# Patient Record
Sex: MALE | Race: WHITE | NOT HISPANIC OR LATINO | Employment: OTHER | URBAN - METROPOLITAN AREA
[De-identification: names, ages, dates, MRNs, and addresses within clinical notes are randomized per-mention and may not be internally consistent; named-entity substitution may affect disease eponyms.]

---

## 2016-12-28 LAB — CRC RECOMMENDATION EXT: NORMAL

## 2017-08-21 LAB
BUN SERPL-MCNC: 17 MG/DL (ref 7–18)
CALCIUM SERPL-MCNC: 8.5 MG/DL (ref 9–10.9)
CHLORIDE SERPL-SCNC: 106 MMOL/L (ref 98–116)
CHOLEST SERPL-MCNC: 151 MG/DL
CO2 SERPL-SCNC: 29 MMOL/L (ref 15–28)
CREAT SERPL-MCNC: 0.99 MG/DL (ref 0.6–1.3)
GLUCOSE SERPL-MCNC: 100 MG/DL (ref 74–106)
HDLC SERPL-MCNC: 47 MG/DL (ref 35–60)
LDLC SERPL CALC-MCNC: 92 MG/DL
POTASSIUM SERPL-SCNC: 3.7 MMOL/L (ref 3.5–5.1)
SODIUM SERPL-SCNC: 142 MEQ/L (ref 131–145)
TRIGL SERPL-MCNC: 59 MG/DL (ref 30–150)

## 2022-04-09 ENCOUNTER — HISTORICAL (OUTPATIENT)
Dept: ADMINISTRATIVE | Facility: HOSPITAL | Age: 77
End: 2022-04-09

## 2022-04-25 VITALS
SYSTOLIC BLOOD PRESSURE: 110 MMHG | WEIGHT: 178 LBS | DIASTOLIC BLOOD PRESSURE: 58 MMHG | BODY MASS INDEX: 24.92 KG/M2 | HEIGHT: 71 IN | OXYGEN SATURATION: 96 %

## 2022-09-06 DIAGNOSIS — K40.90 INGUINAL HERNIA WITHOUT OBSTRUCTION OR GANGRENE, RECURRENCE NOT SPECIFIED, UNSPECIFIED LATERALITY: ICD-10-CM

## 2022-09-06 DIAGNOSIS — I10 HYPERTENSION, UNSPECIFIED TYPE: Primary | ICD-10-CM

## 2022-09-06 DIAGNOSIS — N40.0 BENIGN PROSTATIC HYPERPLASIA, UNSPECIFIED WHETHER LOWER URINARY TRACT SYMPTOMS PRESENT: ICD-10-CM

## 2022-09-06 DIAGNOSIS — Z00.00 WELL ADULT EXAM: ICD-10-CM

## 2022-09-06 DIAGNOSIS — Z12.5 SCREENING PSA (PROSTATE SPECIFIC ANTIGEN): ICD-10-CM

## 2022-09-08 ENCOUNTER — DOCUMENTATION ONLY (OUTPATIENT)
Dept: INTERNAL MEDICINE | Facility: CLINIC | Age: 77
End: 2022-09-08
Payer: MEDICARE

## 2022-09-14 ENCOUNTER — OFFICE VISIT (OUTPATIENT)
Dept: INTERNAL MEDICINE | Facility: CLINIC | Age: 77
End: 2022-09-14
Payer: MEDICARE

## 2022-09-14 VITALS
WEIGHT: 180.38 LBS | HEIGHT: 71 IN | DIASTOLIC BLOOD PRESSURE: 66 MMHG | BODY MASS INDEX: 25.25 KG/M2 | RESPIRATION RATE: 18 BRPM | HEART RATE: 52 BPM | SYSTOLIC BLOOD PRESSURE: 122 MMHG | OXYGEN SATURATION: 99 %

## 2022-09-14 DIAGNOSIS — Z12.5 ENCOUNTER FOR SCREENING FOR MALIGNANT NEOPLASM OF PROSTATE: ICD-10-CM

## 2022-09-14 DIAGNOSIS — R09.89 BRUIT OF RIGHT CAROTID ARTERY: ICD-10-CM

## 2022-09-14 DIAGNOSIS — N40.0 BENIGN PROSTATIC HYPERPLASIA, UNSPECIFIED WHETHER LOWER URINARY TRACT SYMPTOMS PRESENT: ICD-10-CM

## 2022-09-14 DIAGNOSIS — K40.90 INGUINAL HERNIA WITHOUT OBSTRUCTION OR GANGRENE, RECURRENCE NOT SPECIFIED, UNSPECIFIED LATERALITY: ICD-10-CM

## 2022-09-14 DIAGNOSIS — G47.33 OBSTRUCTIVE SLEEP APNEA SYNDROME: ICD-10-CM

## 2022-09-14 DIAGNOSIS — Z00.00 WELLNESS EXAMINATION: Primary | ICD-10-CM

## 2022-09-14 DIAGNOSIS — I10 HYPERTENSION, UNSPECIFIED TYPE: ICD-10-CM

## 2022-09-14 PROBLEM — R01.1 HEART MURMUR: Status: ACTIVE | Noted: 2022-09-14

## 2022-09-14 PROCEDURE — 99212 OFFICE O/P EST SF 10 MIN: CPT | Mod: 25,,, | Performed by: INTERNAL MEDICINE

## 2022-09-14 PROCEDURE — 99212 PR OFFICE/OUTPT VISIT, EST, LEVL II, 10-19 MIN: ICD-10-PCS | Mod: 25,,, | Performed by: INTERNAL MEDICINE

## 2022-09-14 PROCEDURE — G0439 PR MEDICARE ANNUAL WELLNESS SUBSEQUENT VISIT: ICD-10-PCS | Mod: ,,, | Performed by: INTERNAL MEDICINE

## 2022-09-14 PROCEDURE — G0439 PPPS, SUBSEQ VISIT: HCPCS | Mod: ,,, | Performed by: INTERNAL MEDICINE

## 2022-09-14 RX ORDER — TERAZOSIN 5 MG/1
5 CAPSULE ORAL DAILY
COMMUNITY
Start: 2022-07-22 | End: 2022-09-14 | Stop reason: SDUPTHER

## 2022-09-14 RX ORDER — TERAZOSIN 5 MG/1
5 CAPSULE ORAL DAILY
Qty: 90 CAPSULE | Refills: 3 | Status: SHIPPED | OUTPATIENT
Start: 2022-09-14 | End: 2023-09-19 | Stop reason: SDUPTHER

## 2022-09-14 RX ORDER — HYDROCHLOROTHIAZIDE 25 MG/1
25 TABLET ORAL DAILY
Qty: 90 TABLET | Refills: 3 | Status: SHIPPED | OUTPATIENT
Start: 2022-09-14 | End: 2023-09-19 | Stop reason: SDUPTHER

## 2022-09-14 RX ORDER — ENALAPRIL MALEATE 5 MG/1
5 TABLET ORAL DAILY
Qty: 90 TABLET | Refills: 3 | Status: SHIPPED | OUTPATIENT
Start: 2022-09-14 | End: 2023-09-19 | Stop reason: SDUPTHER

## 2022-09-14 RX ORDER — FINASTERIDE 5 MG/1
5 TABLET, FILM COATED ORAL DAILY
COMMUNITY
Start: 2022-08-19 | End: 2022-09-14 | Stop reason: SDUPTHER

## 2022-09-14 RX ORDER — POTASSIUM CHLORIDE 750 MG/1
10 TABLET, EXTENDED RELEASE ORAL DAILY
Qty: 90 TABLET | Refills: 3 | Status: SHIPPED | OUTPATIENT
Start: 2022-09-14 | End: 2023-09-19 | Stop reason: SDUPTHER

## 2022-09-14 RX ORDER — FINASTERIDE 5 MG/1
5 TABLET, FILM COATED ORAL DAILY
Qty: 90 TABLET | Refills: 3 | Status: SHIPPED | OUTPATIENT
Start: 2022-09-14 | End: 2023-09-11

## 2022-09-14 RX ORDER — POTASSIUM CHLORIDE 750 MG/1
10 TABLET, EXTENDED RELEASE ORAL DAILY
COMMUNITY
Start: 2022-08-08 | End: 2022-09-14 | Stop reason: SDUPTHER

## 2022-09-14 NOTE — PROGRESS NOTES
"Subjective:       Patient ID: German Godwin is a 76 y.o. male.    Chief Complaint: Medicare AWV      The patient is a 76-year-old man in for wellness check.  He feels well overall.  No new complaints.    There is a strong family history of prostate cancer and also follows up with his urologist, Dr. Briceno on a yearly basis.  I think he sees him in for 5 months.    Review of Systems     Current Outpatient Medications on File Prior to Visit   Medication Sig Dispense Refill    [DISCONTINUED] enalapril (VASOTEC) 5 MG tablet TAKE ONE TABLET BY MOUTH ONCE DAILY 90 tablet 0    [DISCONTINUED] finasteride (PROSCAR) 5 mg tablet Take 5 mg by mouth once daily.      [DISCONTINUED] hydroCHLOROthiazide (HYDRODIURIL) 25 MG tablet TAKE ONE TABLET BY MOUTH ONCE DAILY 90 tablet 0    [DISCONTINUED] potassium chloride SA (K-DUR,KLOR-CON M) 10 MEQ tablet Take 10 mEq by mouth once daily.      [DISCONTINUED] terazosin (HYTRIN) 5 MG capsule Take 5 mg by mouth once daily.       No current facility-administered medications on file prior to visit.     Objective:      /66 (BP Location: Right arm, Patient Position: Sitting, BP Method: Large (Manual))   Pulse (!) 52   Resp 18   Ht 5' 11" (1.803 m)   Wt 81.8 kg (180 lb 6.4 oz)   SpO2 99%   BMI 25.16 kg/m²     Physical Exam  Vitals reviewed.   Constitutional:       Appearance: Normal appearance.   HENT:      Head: Normocephalic and atraumatic.      Right Ear: Tympanic membrane normal.      Left Ear: Tympanic membrane normal.      Mouth/Throat:      Pharynx: Oropharynx is clear.   Eyes:      Pupils: Pupils are equal, round, and reactive to light.   Neck:      Vascular: No carotid bruit.      Comments: There is a right-sided carotid bruit.  This was not present on prior exam.  Cardiovascular:      Rate and Rhythm: Normal rate and regular rhythm.      Pulses: Normal pulses.      Heart sounds: Normal heart sounds.   Pulmonary:      Effort: Pulmonary effort is normal.      Breath sounds: " Normal breath sounds.   Abdominal:      General: Abdomen is flat.      Palpations: Abdomen is soft. There is no mass.      Tenderness: There is no abdominal tenderness. There is no guarding.   Genitourinary:     Comments: There is a large right inguinal hernia.  Reducible.  Nontender.  Rectal exam reveals a normal firm feeling prostate.  No nodules.  Heme-negative stool.  Musculoskeletal:         General: No swelling.      Cervical back: Neck supple.   Lymphadenopathy:      Cervical: No cervical adenopathy.   Skin:     General: Skin is warm and dry.   Neurological:      General: No focal deficit present.      Mental Status: He is alert and oriented to person, place, and time.     Lab work reviewed and acceptable  Assessment:       1. Wellness examination    2. Hypertension, unspecified type    3. Benign prostatic hyperplasia, unspecified whether lower urinary tract symptoms present    4. Inguinal hernia without obstruction or gangrene, recurrence not specified, unspecified laterality    5. Obstructive sleep apnea syndrome    6. Bruit of right carotid artery    7. Encounter for screening for malignant neoplasm of prostate        1. Wellness.  Overall stable     2. Hypertension.  Control    3. BPH.  Stable     4. Carotid bruit.  New finding.  Asymptomatic.      5. Obstructive sleep apnea.  Stable     6. Borderline hyperglycemia.  Stable  Plan:       Check carotid ultrasound.    Continue same meds and TLC and follow-up yearly with CBC CMP lipid TSH PSA prior.

## 2022-09-15 ENCOUNTER — HISTORICAL (OUTPATIENT)
Dept: ADMINISTRATIVE | Facility: HOSPITAL | Age: 77
End: 2022-09-15
Payer: MEDICARE

## 2022-09-27 NOTE — PROGRESS NOTES
Patient:   German Godwin             MRN: [id number]            FIN: [id number]               Age:   71 years     Sex:  Male     :  1945   Associated Diagnoses:   None   Author:   Garcia Vail MD      Chief Complaint   2017 13:00 CDT      Annual        History of Present Illness   The patient is a 71-year-old man in for yearly follow-up.  He feels fine.  Since I saw him last he had a screening colonoscopy which was negative.  He did not need a repeat for 10 years or perhaps not even been as he will be 81 by that time.  He continues to see his urologist on a regular basis.  He will see him until late .  He is requesting that we check the prostate today.    There is a strong family history of prostate cancer.                 Health Status   Allergies:    Allergic Reactions (Selected)  No Known Medication Allergies,    Allergies (1) Active Reaction  No Known Medication Allergies None Documented     Current medications:  (Selected)   Prescriptions  Prescribed  Klor-Con M10 oral tablet, extended release: See Instructions, TAKE ONE TABLET BY MOUTH ONCE DAILY, # 90 tab(s), 3 Refill(s), eRx: [id number]  enalapril 5 mg oral tablet: See Instructions, TAKE ONE TABLET BY MOUTH ONCE DAILY, # 90 tab(s), 3 Refill(s), eRx: [facility]  hydrochlorothiazide 25 mg oral tablet: See Instructions, TAKE ONE TABLET BY MOUTH ONCE DAILY , # 90 tab(s), 2 Refill(s), eRx: [facility]  terazosin 5 mg oral capsule: See Instructions, TAKE ONE CAPSULE BY MOUTH ONCE DAILY, # 90 cap(s), 3 Refill(s), eRx: [facility]  Documented Medications  Documented  DOXYCYCLINE IR-DR 40 M mg = 1 cap(s), Oral, Daily  MENS FORMULA: MENS FORMULA, TWO CAPS ONCE DAILY, 0 Refill(s)  MetroGel: 1 erin, TOP, Daily, daily, 0 Refill(s), Type: Maintenance  Vitamin C: 500 mg, Oral, Daily  aspirin 81 mg oral tablet: 1 tab(s) ( 81 mg ), PO, Daily, # 30 tab(s), 0  Refill(s), Type: Maintenance  finasteride 5 mg oral tablet: 1 tab(s) ( 5 mg ), PO, Daily, # 30 tab(s), 0 Refill(s), Type: Maintenance,    Home Medications (10) Active  aspirin 81 mg oral tablet   DOXYCYCLINE IR-DR 40 MG 40 mg = 1 cap(s), Oral, Daily  enalapril 5 mg oral tablet See Instructions  finasteride 5 mg oral tablet   hydrochlorothiazide 25 mg oral tablet See Instructions  Klor-Con M10 oral tablet, extended release See Instructions  MENS FORMULA   MetroGel 1 erin, TOP, Daily  terazosin 5 mg oral capsule See Instructions  Vitamin C 500 mg, Oral, Daily     Problem list:    All Problems  Benign essential hypertension / SNOMED CT 1191470 / Confirmed  Heart murmur(  Confirmed  ) / SNOMED CT 166278711 / Confirmed  Obstructive sleep apnea syndrome / SNOMED CT 159608684 / Confirmed  Well adult / SNOMED CT 606861636 / Confirmed  Resolved: Hypertension, essential(  Confirmed  ) / SNOMED CT 32929219  Resolved: Obstructive sleep apnea / SNOMED CT 1624583925  Resolved: Well adult health check(  Confirmed  ) / SNOMED CT 835878087      Histories   Past Medical History:    Resolved  Hypertension, essential(  Confirmed  ) (20400734):  Resolved on 8/12/2015 at 69 years.  Well adult health check(  Confirmed  ) (550371983):  Resolved on 8/12/2015 at 69 years.  Obstructive sleep apnea (6937984951):  Resolved on 8/17/2016 at 70 years.   Family History:    Cancer  Father     Procedure history:    Colonoscopy (521506049) on 12/28/2016 at 71 Years.  eye surgery in 1950 at 5 Years.  tonsillectomy.  throat surgery.   Social History        Social & Psychosocial Habits    Alcohol  08/12/2015  Use: Current    Frequency: Daily    Employment/School  08/12/2015  Status: Retired    Exercise  08/12/2015  Times per week: 5-6 times/week    Home/Environment  08/12/2015  Lives with: Spouse    Nutrition/Health  08/12/2015  Type of diet: Regular    Substance Abuse  08/12/2015  Use: Never    Tobacco  08/22/2017  Use: Former smoker     Comment: IN COLLEGE 45 YEARS AGO - 08/12/2015 09:01 - Beckie WATSONSonya  .        Physical Examination   Vital Signs   8/22/2017 13:00 CDT      Peripheral Pulse Rate     49 bpm  LOW                             Respiratory Rate          12 br/min                             SpO2                      98 %                             Systolic Blood Pressure   118 mmHg                             Diastolic Blood Pressure  82 mmHg     Measurements from flowsheet : Measurements   8/22/2017 13:00 CDT      Weight Dosing             81.5 kg                             Weight Measured           81.5 kg                             Weight Measured and Calculated in Lbs     179.67 lb                             Height/Length Dosing      180.34 cm                             Height/Length Measured    180.34 cm                             BSA Measured              2.02 m2                             Body Mass Index Measured  25.06 kg/m2     Vital signs are stable.    General appearance is unremarkable. Patient is in no distress    HEENT exam reveals clear sclera and reactive pupils. Throat clear. Tympanic membranes clear.    Neck supple without thyromegaly or adenopathy. Carotid pulses 2+ bilaterally without bruits. No jugular venous distention.    Heart has a regular rate and rhythm with 2/6 to 3/6 systolic murmur left sternal border    Lungs clear    Abdomen is nontender and without mass or hepatosplenomegaly.    Back without CVAT.    Extremities without clubbing cyanosis or edema. Foot pulses palpable.    Prostate exam reveals normal size prostate.  No masses.  Heme-negative stool.      Review / Management   Lab work pending.      Impression and Plan   1.  Wellness exam    2.  Hypertension.  Controlled    3.  Prostatism.  Followed by Dr. Minh Briceno.  Controlled    4.  Strong family history of prostate cancer.  No evidence of development    5.  History of obstructive sleep apnea    6.  History of acne.  Followed by   Karen    7.  Heart murmur.  Stable.  Likely flow murmur.  Asymptomatic    The plan will be to continue same meds and TLC.  Follow-up 1 year with CBC CMP lipid prior.  This is assuming pending blood test are all okay.

## 2022-12-17 ENCOUNTER — DOCUMENTATION ONLY (OUTPATIENT)
Dept: INTERNAL MEDICINE | Facility: CLINIC | Age: 77
End: 2022-12-17
Payer: MEDICARE

## 2023-09-09 DIAGNOSIS — N40.0 BENIGN PROSTATIC HYPERPLASIA, UNSPECIFIED WHETHER LOWER URINARY TRACT SYMPTOMS PRESENT: Primary | ICD-10-CM

## 2023-09-11 ENCOUNTER — TELEPHONE (OUTPATIENT)
Dept: INTERNAL MEDICINE | Facility: CLINIC | Age: 78
End: 2023-09-11
Payer: MEDICARE

## 2023-09-11 RX ORDER — FINASTERIDE 5 MG/1
5 TABLET, FILM COATED ORAL
Qty: 90 TABLET | Refills: 0 | Status: SHIPPED | OUTPATIENT
Start: 2023-09-11 | End: 2023-12-08

## 2023-09-11 NOTE — TELEPHONE ENCOUNTER
----- Message from Mary Mcgowan sent at 9/11/2023  9:44 AM CDT -----  Regarding: labs  .Caller is requesting to schedule their Lab appointment prior to annual appointment.  Order is not listed in EPIC.  Please enter order and contact patient to schedule.    Name of Caller:pt    Preferred Date and Time of Labs:    Date of [facility] Appointment:9/12    Where would they like the lab performed?[facility]    Would the patient rather a call back or a response via [facility]?     Best Call Back Number:[phone]    Additional Information:[facility] sent     Pt is requesting a call back [phone]

## 2023-09-11 NOTE — TELEPHONE ENCOUNTER
----- Message from Tamy Holly LPN sent at 9/11/2023 12:26 PM CDT -----  Regarding: maki Perales 9/19 @9:20  Fasting labs needed.

## 2023-09-13 ENCOUNTER — TELEPHONE (OUTPATIENT)
Dept: INTERNAL MEDICINE | Facility: CLINIC | Age: 78
End: 2023-09-13
Payer: MEDICARE

## 2023-09-13 NOTE — TELEPHONE ENCOUNTER
----- Message from Judson Vilchis sent at 9/13/2023 11:50 AM CDT -----  .Type:  Needs Medical Advice    Who Called: pt's wife Sarahy  Symptoms (please be specific):    How long has patient had these symptoms:    Pharmacy name and phone #:    Would the patient rather a call back or a response via [facility]? Call back  Best Call Back Number: [phone]  Additional Information: pt's wife is wanting to discuss pt's upcoming appt on 09/19/23

## 2023-09-13 NOTE — TELEPHONE ENCOUNTER
Pt wife stated that he is agitated more , she also stated that pt needs to be evaluated for parkinson as his mom had parkinson, pt gait is currently off. Please Advise at pt upcoming appoinment on the 19th as wife won't be able to make it with him.

## 2023-09-19 ENCOUNTER — OFFICE VISIT (OUTPATIENT)
Dept: INTERNAL MEDICINE | Facility: CLINIC | Age: 78
End: 2023-09-19
Payer: MEDICARE

## 2023-09-19 VITALS
DIASTOLIC BLOOD PRESSURE: 73 MMHG | HEART RATE: 64 BPM | WEIGHT: 177.63 LBS | BODY MASS INDEX: 24.87 KG/M2 | SYSTOLIC BLOOD PRESSURE: 119 MMHG | RESPIRATION RATE: 18 BRPM | OXYGEN SATURATION: 97 % | HEIGHT: 71 IN

## 2023-09-19 DIAGNOSIS — Z00.00 WELLNESS EXAMINATION: Primary | ICD-10-CM

## 2023-09-19 DIAGNOSIS — Z12.5 ENCOUNTER FOR SCREENING FOR MALIGNANT NEOPLASM OF PROSTATE: ICD-10-CM

## 2023-09-19 DIAGNOSIS — I65.23 BILATERAL CAROTID ARTERY STENOSIS: ICD-10-CM

## 2023-09-19 DIAGNOSIS — I10 HYPERTENSION, UNSPECIFIED TYPE: ICD-10-CM

## 2023-09-19 DIAGNOSIS — N40.0 BENIGN PROSTATIC HYPERPLASIA, UNSPECIFIED WHETHER LOWER URINARY TRACT SYMPTOMS PRESENT: ICD-10-CM

## 2023-09-19 DIAGNOSIS — G47.33 OBSTRUCTIVE SLEEP APNEA SYNDROME: ICD-10-CM

## 2023-09-19 PROCEDURE — G0439 PPPS, SUBSEQ VISIT: HCPCS | Mod: ,,, | Performed by: INTERNAL MEDICINE

## 2023-09-19 PROCEDURE — G0439 PR MEDICARE ANNUAL WELLNESS SUBSEQUENT VISIT: ICD-10-PCS | Mod: ,,, | Performed by: INTERNAL MEDICINE

## 2023-09-19 RX ORDER — POTASSIUM CHLORIDE 750 MG/1
10 TABLET, EXTENDED RELEASE ORAL DAILY
Qty: 90 TABLET | Refills: 3 | Status: SHIPPED | OUTPATIENT
Start: 2023-09-19

## 2023-09-19 RX ORDER — HYDROCHLOROTHIAZIDE 25 MG/1
25 TABLET ORAL DAILY
Qty: 90 TABLET | Refills: 3 | Status: SHIPPED | OUTPATIENT
Start: 2023-09-19

## 2023-09-19 RX ORDER — ENALAPRIL MALEATE 5 MG/1
5 TABLET ORAL DAILY
Qty: 90 TABLET | Refills: 3 | Status: SHIPPED | OUTPATIENT
Start: 2023-09-19

## 2023-09-19 RX ORDER — TERAZOSIN 5 MG/1
5 CAPSULE ORAL DAILY
Qty: 90 CAPSULE | Refills: 3 | Status: SHIPPED | OUTPATIENT
Start: 2023-09-19

## 2023-09-19 RX ORDER — ASCORBIC ACID 500 MG
500 TABLET ORAL 2 TIMES DAILY
COMMUNITY

## 2023-09-19 NOTE — PROGRESS NOTES
Subjective:      Patient Care Team:  Garcia Vail MD as PCP - General (Internal Medicine)      Patient ID: German Godwin is a 77 y.o. male.    Chief Complaint: Medicare AWV      Is a 77-year-old man in for wellness check.  He feels fine overall.  Since I saw him last he had cataract surgery with good results.  Also he is had a slight increase in his PSA.  This concerned him because his brother who is 2 years older recently was diagnosed with prostate cancer.  He had a normal PSA the quickly asif to 12 and was found have metastatic disease.      Review of Systems   All other systems reviewed and are negative.       Current Outpatient Medications on File Prior to Visit   Medication Sig Dispense Refill    ascorbic acid, vitamin C, (VITAMIN C) 500 MG tablet Take 500 mg by mouth 2 (two) times daily.      aspirin (ECOTRIN) 81 MG EC tablet Take 81 mg by mouth once daily.      finasteride (PROSCAR) 5 mg tablet TAKE ONE TABLET BY MOUTH ONCE DAILY 90 tablet 0    ZINC PICOLINATE ORAL Take 25 mg by mouth once daily.      [DISCONTINUED] enalapril (VASOTEC) 5 MG tablet Take 1 tablet (5 mg total) by mouth once daily. 90 tablet 3    [DISCONTINUED] hydroCHLOROthiazide (HYDRODIURIL) 25 MG tablet Take 1 tablet (25 mg total) by mouth once daily. 90 tablet 3    [DISCONTINUED] potassium chloride SA (K-DUR,KLOR-CON M) 10 MEQ tablet Take 1 tablet (10 mEq total) by mouth once daily. 90 tablet 3    [DISCONTINUED] terazosin (HYTRIN) 5 MG capsule Take 1 capsule (5 mg total) by mouth once daily. 90 capsule 3     No current facility-administered medications on file prior to visit.       Patient Reported Health Risk Assessment       Opioid Screening: Patient medication list reviewed, patient is not taking prescription opioids. Patient is not using additional opioids than prescribed. Patient is at low risk of substance abuse based on this opioid use history.       Objective:      /73 (BP Location: Right arm, Patient Position:  "Sitting, BP Method: Large (Manual))   Pulse 64   Resp 18   Ht 5' 11" (1.803 m)   Wt 80.6 kg (177 lb 9.6 oz)   SpO2 97%   BMI 24.77 kg/m²     Physical Exam  Vitals reviewed.   Constitutional:       Appearance: Normal appearance.   HENT:      Head: Normocephalic and atraumatic.      Right Ear: Tympanic membrane normal.      Left Ear: Tympanic membrane normal.      Mouth/Throat:      Pharynx: Oropharynx is clear.   Eyes:      Pupils: Pupils are equal, round, and reactive to light.   Neck:      Vascular: No carotid bruit.   Cardiovascular:      Rate and Rhythm: Normal rate and regular rhythm.      Pulses: Normal pulses.      Heart sounds: Normal heart sounds.   Pulmonary:      Effort: Pulmonary effort is normal.      Breath sounds: Normal breath sounds.   Abdominal:      General: Abdomen is flat.      Palpations: Abdomen is soft. There is no mass.      Tenderness: There is no abdominal tenderness. There is no guarding.   Genitourinary:     Comments: Rectal exam reveals normal tone and no mass.  Prostate slightly enlarged but smooth without nodularities and heme-negative stool  Musculoskeletal:         General: No swelling.      Cervical back: Neck supple.   Lymphadenopathy:      Cervical: No cervical adenopathy.   Skin:     General: Skin is warm and dry.   Neurological:      General: No focal deficit present.      Mental Status: He is alert and oriented to person, place, and time.       Laboratory studies pending.  Done last week but we never got results           No data to display                  9/19/2023     9:20 AM 9/14/2022     9:00 AM   Fall Risk Assessment - Outpatient   Mobility Status Ambulatory Ambulatory   Number of falls 0 0   Identified as fall risk False False                Assessment:       1. Wellness    2. Hypertension.  Good control    3. BPH.  Recent MRI results reviewed and looks fine.  Followed by Dr. Minh Briceno    4. Mild carotid stenosis.  Repeat ultrasound due in 1 year     5. History of " obstructive sleep apnea  Plan:     Continue same meds TLC etc..  Await findings of blood tests.  Send him a copy of test results as well.  Follow-up yearly with CBC CMP lipid TSH PSA prior          Medicare Annual Wellness and Personalized Prevention Plan:   Fall Risk + Home Safety + Hearing Impairment + Depression Screen + Cognitive Impairment Screen + Health Risk Assessment all reviewed.       Health Maintenance Topics with due status: Not Due       Topic Last Completion Date    TETANUS VACCINE 04/29/2017    Lipid Panel 09/07/2022      The patient's Health Maintenance was reviewed and the following appears to be due at this time:   Health Maintenance Due   Topic Date Due    Hepatitis C Screening  Never done    Shingles Vaccine (1 of 2) Never done    COVID-19 Vaccine (4 - Pfizer series) 01/24/2022    Influenza Vaccine (1) Never done         Advance Care Planning   I attest to discussing Advance Care Planning with patient and/or family member.  Education was provided including the importance of the Health Care Power of , Advance Directives, and/or [place] documentation.  The patient expressed understanding to the importance of this information and discussion.  Length of ACP conversation in minutes:          Follow up in about 1 year (around 9/19/2024). In addition to their scheduled follow up, the patient has also been instructed to follow up on as needed basis.

## 2023-12-08 DIAGNOSIS — N40.0 BENIGN PROSTATIC HYPERPLASIA, UNSPECIFIED WHETHER LOWER URINARY TRACT SYMPTOMS PRESENT: ICD-10-CM

## 2023-12-08 RX ORDER — FINASTERIDE 5 MG/1
5 TABLET, FILM COATED ORAL
Qty: 90 TABLET | Refills: 0 | Status: SHIPPED | OUTPATIENT
Start: 2023-12-08

## 2024-06-17 ENCOUNTER — TELEPHONE (OUTPATIENT)
Dept: INTERNAL MEDICINE | Facility: CLINIC | Age: 79
End: 2024-06-17
Payer: MEDICARE

## 2024-06-17 NOTE — TELEPHONE ENCOUNTER
Attempted to call patient back, no option to leave voicemail message. Lab orders put in the mail today.

## 2024-06-17 NOTE — TELEPHONE ENCOUNTER
----- Message from Bailee James sent at 6/17/2024  9:57 AM CDT -----  Who Called: German Godwin    What order is the patient requesting: Labs   When does the expect the orders to be performed? [facility]        Preferred Method of Contact: Phone Call  Patient's Preferred Phone Number on File: [phone]   Best Call Back Number, if different:  Additional Information: German is requesting his lab order for his  wellness be mailed to  him. Needs the lab ordered mailed before 10/03/24.      German Godwin   [address] [place] [address]. [phone]

## 2024-09-25 LAB
ALBUMIN SERPL-MCNC: 4 G/DL (ref 3.8–4.8)
ALP SERPL-CCNC: 114 IU/L (ref 44–121)
ALT SERPL-CCNC: 13 IU/L (ref 0–44)
AST SERPL-CCNC: 23 IU/L (ref 0–40)
BASOPHILS # BLD AUTO: 0 X10E3/UL (ref 0–0.2)
BASOPHILS NFR BLD AUTO: 1 %
BILIRUB SERPL-MCNC: 0.5 MG/DL (ref 0–1.2)
BUN SERPL-MCNC: 14 MG/DL (ref 8–27)
BUN/CREAT SERPL: 14 (ref 10–24)
CALCIUM SERPL-MCNC: 8.7 MG/DL (ref 8.6–10.2)
CHLORIDE SERPL-SCNC: 100 MMOL/L (ref 96–106)
CHOLEST SERPL-MCNC: 144 MG/DL (ref 100–199)
CO2 SERPL-SCNC: 27 MMOL/L (ref 20–29)
CREAT SERPL-MCNC: 0.98 MG/DL (ref 0.76–1.27)
EOSINOPHIL # BLD AUTO: 0.1 X10E3/UL (ref 0–0.4)
EOSINOPHIL NFR BLD AUTO: 1 %
ERYTHROCYTE [DISTWIDTH] IN BLOOD BY AUTOMATED COUNT: 12.5 % (ref 11.6–15.4)
EST. GFR  (NO RACE VARIABLE): 79 ML/MIN/1.73
GLOBULIN SER CALC-MCNC: 2.3 G/DL (ref 1.5–4.5)
GLUCOSE SERPL-MCNC: 105 MG/DL (ref 70–99)
HCT VFR BLD AUTO: 41.1 % (ref 37.5–51)
HDLC SERPL-MCNC: 74 MG/DL
HGB BLD-MCNC: 13.8 G/DL (ref 13–17.7)
LDLC SERPL CALC-MCNC: 58 MG/DL (ref 0–99)
LYMPHOCYTES # BLD AUTO: 2.1 X10E3/UL (ref 0.7–3.1)
LYMPHOCYTES NFR BLD AUTO: 34 %
MCH RBC QN AUTO: 31.2 PG (ref 26.6–33)
MCHC RBC AUTO-ENTMCNC: 33.6 G/DL (ref 31.5–35.7)
MCV RBC AUTO: 93 FL (ref 79–97)
MONOCYTES # BLD AUTO: 0.5 X10E3/UL (ref 0.1–0.9)
MONOCYTES NFR BLD AUTO: 8 %
NEUTROPHILS # BLD AUTO: 3.5 X10E3/UL (ref 1.4–7)
NEUTROPHILS NFR BLD AUTO: 56 %
PLATELET # BLD AUTO: 185 X10E3/UL (ref 150–450)
POTASSIUM SERPL-SCNC: 4 MMOL/L (ref 3.5–5.2)
PROT SERPL-MCNC: 6.3 G/DL (ref 6–8.5)
PSA SERPL-MCNC: 4.2 NG/ML (ref 0–4)
RBC # BLD AUTO: 4.43 X10E6/UL (ref 4.14–5.8)
SODIUM SERPL-SCNC: 139 MMOL/L (ref 134–144)
SPECIMEN STATUS REPORT: NORMAL
TRIGL SERPL-MCNC: 53 MG/DL (ref 0–149)
TSH SERPL DL<=0.005 MIU/L-ACNC: 1.96 UIU/ML (ref 0.45–4.5)
VLDLC SERPL CALC-MCNC: 12 MG/DL (ref 5–40)
WBC # BLD AUTO: 6.3 X10E3/UL (ref 3.4–10.8)

## 2024-09-27 ENCOUNTER — DOCUMENTATION ONLY (OUTPATIENT)
Dept: INTERNAL MEDICINE | Facility: CLINIC | Age: 79
End: 2024-09-27
Payer: MEDICARE

## 2024-10-09 ENCOUNTER — TELEPHONE (OUTPATIENT)
Dept: INTERNAL MEDICINE | Facility: CLINIC | Age: 79
End: 2024-10-09
Payer: MEDICARE

## 2024-10-09 NOTE — TELEPHONE ENCOUNTER
----- Message from Nurse Tamy sent at 10/9/2024  4:25 PM CDT -----  Regarding: maki Perales 10/16 @1:20  Fasting labs needed.

## 2024-10-15 DIAGNOSIS — I10 HYPERTENSION, UNSPECIFIED TYPE: Primary | ICD-10-CM

## 2024-10-15 RX ORDER — POTASSIUM CHLORIDE 750 MG/1
10 TABLET, EXTENDED RELEASE ORAL
Qty: 90 TABLET | Refills: 0 | Status: SHIPPED | OUTPATIENT
Start: 2024-10-15

## 2024-10-16 ENCOUNTER — OFFICE VISIT (OUTPATIENT)
Dept: INTERNAL MEDICINE | Facility: CLINIC | Age: 79
End: 2024-10-16
Payer: MEDICARE

## 2024-10-16 VITALS
OXYGEN SATURATION: 98 % | BODY MASS INDEX: 23.8 KG/M2 | DIASTOLIC BLOOD PRESSURE: 65 MMHG | HEART RATE: 54 BPM | RESPIRATION RATE: 18 BRPM | WEIGHT: 170 LBS | SYSTOLIC BLOOD PRESSURE: 127 MMHG | HEIGHT: 71 IN

## 2024-10-16 DIAGNOSIS — G47.33 OBSTRUCTIVE SLEEP APNEA SYNDROME: ICD-10-CM

## 2024-10-16 DIAGNOSIS — R97.20 ABNORMAL PSA: ICD-10-CM

## 2024-10-16 DIAGNOSIS — Z00.00 WELLNESS EXAMINATION: Primary | ICD-10-CM

## 2024-10-16 DIAGNOSIS — I65.23 BILATERAL CAROTID ARTERY STENOSIS: ICD-10-CM

## 2024-10-16 DIAGNOSIS — K40.90 RIGHT INGUINAL HERNIA: ICD-10-CM

## 2024-10-16 DIAGNOSIS — I10 HYPERTENSION, UNSPECIFIED TYPE: ICD-10-CM

## 2024-10-16 DIAGNOSIS — N40.0 BENIGN PROSTATIC HYPERPLASIA, UNSPECIFIED WHETHER LOWER URINARY TRACT SYMPTOMS PRESENT: ICD-10-CM

## 2024-10-16 PROCEDURE — G0439 PPPS, SUBSEQ VISIT: HCPCS | Mod: ,,, | Performed by: INTERNAL MEDICINE

## 2024-10-16 PROCEDURE — 99213 OFFICE O/P EST LOW 20 MIN: CPT | Mod: 25,,, | Performed by: INTERNAL MEDICINE

## 2024-10-16 RX ORDER — TERAZOSIN 5 MG/1
5 CAPSULE ORAL DAILY
Qty: 90 CAPSULE | Refills: 3 | Status: SHIPPED | OUTPATIENT
Start: 2024-10-16

## 2024-10-16 RX ORDER — FINASTERIDE 5 MG/1
5 TABLET, FILM COATED ORAL DAILY
Qty: 90 TABLET | Refills: 3 | Status: SHIPPED | OUTPATIENT
Start: 2024-10-16

## 2024-10-16 RX ORDER — HYDROCHLOROTHIAZIDE 25 MG/1
25 TABLET ORAL DAILY
Qty: 90 TABLET | Refills: 3 | Status: SHIPPED | OUTPATIENT
Start: 2024-10-16

## 2024-10-16 RX ORDER — ENALAPRIL MALEATE 5 MG/1
5 TABLET ORAL DAILY
Qty: 90 TABLET | Refills: 3 | Status: SHIPPED | OUTPATIENT
Start: 2024-10-16

## 2024-10-16 NOTE — PROGRESS NOTES
"   Internal Medicine      Patient ID: [id number]     Chief Complaint: Medicare Annual Wellness     HPI:     German Godwin is a 78 y.o. male here today for a Medicare Annual Wellness visit and comprehensive Health Risk Assessment.     He was also to follow-up numerous problems.  Overall he feels well.  He tells me he is doing more run walking than before.    He continues to have minimal symptoms from his right-sided inguinal hernia.  He was not interested in surgical referral yet.    Family history strong for prostate cancer.  He was a follow-up with Dr. Minh Briceno in the near future in fact to repeat PSA tomorrow.      The following components were reviewed and updated:  Medical history  Family History  Social history  Allergies  Immunizations  Health Maintenance  Patient Care Team  Current Outpatient Medications   Medication Instructions    ascorbic acid (vitamin C) (VITAMIN C) 500 mg, 2 times daily    aspirin (ECOTRIN) 81 mg, Daily    enalapril (VASOTEC) 5 mg, Oral, Daily    finasteride (PROSCAR) 5 mg, Oral, Daily    hydroCHLOROthiazide (HYDRODIURIL) 25 mg, Oral, Daily    potassium chloride SA (K-DUR,KLOR-CON M) 10 MEQ tablet 10 mEq, Oral    terazosin (HYTRIN) 5 mg, Oral, Daily    ZINC PICOLINATE ORAL 25 mg, Daily       Subjective:     Review of Systems    12 point review of systems conducted, negative except as stated in the history of present illness. See HPI for details.    Objective:     Visit Vitals  /65 (BP Location: Right arm, Patient Position: Sitting)   Pulse (!) 54   Resp 18   Ht 5' 11" (1.803 m)   Wt 77.1 kg (170 lb)   SpO2 98%   BMI 23.71 kg/m²       Physical Exam  Vitals reviewed.   Constitutional:       Appearance: Normal appearance.   HENT:      Head: Normocephalic and atraumatic.      Right Ear: Tympanic membrane normal.      Left Ear: Tympanic membrane normal.      Mouth/Throat:      Pharynx: Oropharynx is clear.   Eyes:      Pupils: Pupils are equal, round, and reactive to light.   Neck:      " Vascular: No carotid bruit.   Cardiovascular:      Rate and Rhythm: Normal rate and regular rhythm.      Pulses: Normal pulses.      Heart sounds: Normal heart sounds.      Comments: Regular rate and rhythm with 2/6 systolic murmur lower left sternal border and apex  Pulmonary:      Effort: Pulmonary effort is normal.      Breath sounds: Normal breath sounds.   Abdominal:      General: Abdomen is flat.      Palpations: Abdomen is soft. There is no mass.      Tenderness: There is no abdominal tenderness. There is no guarding.   Genitourinary:     Comments: Large reducible right inguinal hernia noted.  Nontender.  Musculoskeletal:         General: No swelling.      Cervical back: Neck supple.   Lymphadenopathy:      Cervical: No cervical adenopathy.   Skin:     General: Skin is warm and dry.   Neurological:      General: No focal deficit present.      Mental Status: He is alert and oriented to person, place, and time.     Laboratory studies reviewed.  PSA mildly elevated, especially compared to last year    Assessment:     1. Wellness     2. Hypertension.  Excellent control on current meds     3. BPH and elevated PSA.  Likely innocent.    4. Bilateral carotid artery stenosis by ultrasound 2 years ago.  Time for repeat     5. Large right inguinal hernia.  Asymptomatic.  He was not want surgical referral    6. History of obstructive sleep apnea    Plan:     Keep appointment for repeat PSA with Dr. Briceno.    Consider it adding a few minutes of high intensity exercise weekly.      Follow-up with me 6 months with CBC CMP lipid PSA prior.      Schedule carotid ultrasound.    [] ACP Discussed - Has Living Will and HCPOA on file. Will provide our office with copies.  [] ACP Discussion declined.    A comprehensive HEALTH RISK ASSESSMENT was completed today. Results are summarized below:                  The patient is NOT A TOBACCO USER.        All Questions regarding food, transportation or housing were not answered  today.    Orders Placed This Encounter   Procedures    US Carotid Bilateral     Standing Status:   Future     Standing Expiration Date:   10/16/2025     Order Specific Question:   [facility] ONLY: Performing/Resulting Location     Answer:   [facility]     Order Specific Question:   May the Radiologist modify the order per protocol to meet the clinical needs of the patient?     Answer:   Yes     Order Specific Question:   Release to patient     Answer:   Immediate    CBC Auto Differential     Standing Status:   Future     Standing Expiration Date:   10/16/2025    Comprehensive Metabolic Panel     Standing Status:   Future     Standing Expiration Date:   10/16/2025    Lipid Panel     Standing Status:   Future     Standing Expiration Date:   10/16/2025    TSH     Standing Status:   Future     Standing Expiration Date:   10/16/2025    PSA, Total (Diagnostic)     Standing Status:   Future     Standing Expiration Date:   4/16/2026        Medication List with Changes/Refills   Current Medications    ASCORBIC ACID, VITAMIN C, (VITAMIN C) 500 MG TABLET    Take 500 mg by mouth 2 (two) times daily.       Start Date: --        End Date: --    ASPIRIN (ECOTRIN) 81 MG EC TABLET    Take 81 mg by mouth once daily.       Start Date: --        End Date: --    POTASSIUM CHLORIDE SA (K-DUR,KLOR-CON M) 10 MEQ TABLET    TAKE ONE TABLET BY MOUTH ONCE DAILY       Start Date: 10/15/2024End Date: --    ZINC PICOLINATE ORAL    Take 25 mg by mouth once daily.       Start Date: --        End Date: --   Changed and/or Refilled Medications    Modified Medication Previous Medication    ENALAPRIL (VASOTEC) 5 MG TABLET enalapril (VASOTEC) 5 MG tablet       Take 1 tablet (5 mg total) by mouth once daily.    Take 1 tablet (5 mg total) by mouth once daily.       Start Date: 10/16/2024End Date: --    Start Date: 9/19/2023 End Date: 10/16/2024    FINASTERIDE (PROSCAR) 5 MG TABLET finasteride (PROSCAR) 5 mg tablet       Take 1 tablet (5 mg total)  by mouth once daily.    TAKE ONE TABLET BY MOUTH ONCE DAILY       Start Date: 10/16/2024End Date: --    Start Date: 12/8/2023 End Date: 10/16/2024    HYDROCHLOROTHIAZIDE (HYDRODIURIL) 25 MG TABLET hydroCHLOROthiazide (HYDRODIURIL) 25 MG tablet       Take 1 tablet (25 mg total) by mouth once daily.    Take 1 tablet (25 mg total) by mouth once daily.       Start Date: 10/16/2024End Date: --    Start Date: 9/19/2023 End Date: 10/16/2024    TERAZOSIN (HYTRIN) 5 MG CAPSULE terazosin (HYTRIN) 5 MG capsule       Take 1 capsule (5 mg total) by mouth once daily.    Take 1 capsule (5 mg total) by mouth once daily.       Start Date: 10/16/2024End Date: --    Start Date: 9/19/2023 End Date: 10/16/2024        Provided patient with a 5-10 year written screening schedule and personal prevention plan. Recommendations were developed using the USPSTF age appropriate recommendations. Education, counseling, and referrals were provided as needed. After Visit Summary printed and given to patient, which includes a list of additional screenings\tests needed.    Follow up in about 6 months (around 4/16/2025). In addition to their scheduled follow up, the patient has also been instructed to follow up on as needed basis.     No future appointments.     Garcia Vail MD

## 2024-10-17 ENCOUNTER — TELEPHONE (OUTPATIENT)
Dept: INTERNAL MEDICINE | Facility: CLINIC | Age: 79
End: 2024-10-17
Payer: MEDICARE

## 2024-10-17 NOTE — TELEPHONE ENCOUNTER
----- Message from Pily sent at 10/17/2024 10:53 AM CDT -----  .Who Called: Greman Godwin        Preferred Method of Contact: Phone Call  Patient's Preferred Phone Number on File: [phone]   Best Call Back Number, if different:  Additional Information: pt asking for his labs for next year to be nail to his address

## 2024-10-18 ENCOUNTER — TELEPHONE (OUTPATIENT)
Dept: INTERNAL MEDICINE | Facility: CLINIC | Age: 79
End: 2024-10-18
Payer: MEDICARE

## 2024-10-18 NOTE — TELEPHONE ENCOUNTER
----- Message from Garcia Vail MD sent at 10/17/2024  5:15 PM CDT -----  Tell him carotid ultrasound shows mild blockage on both sides.  No change in treatment needed.  Follow-up as scheduled.  Plan to repeat ultrasound in 2 years.  ----- Message -----  From: Guy, Rad Results In  Sent: 10/16/2024   4:37 PM CDT  To: Garcia Vail MD

## 2024-12-18 ENCOUNTER — OFFICE VISIT (OUTPATIENT)
Dept: INTERNAL MEDICINE | Facility: CLINIC | Age: 79
End: 2024-12-18
Payer: MEDICARE

## 2024-12-18 VITALS
HEIGHT: 71 IN | BODY MASS INDEX: 23.77 KG/M2 | HEART RATE: 54 BPM | OXYGEN SATURATION: 98 % | SYSTOLIC BLOOD PRESSURE: 124 MMHG | RESPIRATION RATE: 18 BRPM | WEIGHT: 169.81 LBS | DIASTOLIC BLOOD PRESSURE: 62 MMHG

## 2024-12-18 DIAGNOSIS — J40 BRONCHITIS: ICD-10-CM

## 2024-12-18 DIAGNOSIS — R05.9 COUGH, UNSPECIFIED TYPE: Primary | ICD-10-CM

## 2024-12-18 PROCEDURE — 99213 OFFICE O/P EST LOW 20 MIN: CPT | Mod: ,,, | Performed by: INTERNAL MEDICINE

## 2024-12-18 RX ORDER — AZITHROMYCIN 250 MG/1
TABLET, FILM COATED ORAL
Qty: 6 TABLET | Refills: 0 | Status: SHIPPED | OUTPATIENT
Start: 2024-12-18 | End: 2024-12-23

## 2024-12-18 NOTE — PROGRESS NOTES
"Patient ID: [id number]      Subjective:     Chief Complaint: Cough (Non productive Cough, nasal congestion x two weeks. Pt states he's been using an OTC nasal spray and cough syrup)      German Godwin is a 79 y.o. male.  The patient is a 79-year-old man who has had a cough for about 8-10 days.  Started off with nasal congestion.  Chills early on.  No documented fever.  Now with a loose sounding cough.  Mostly nonproductive.  No shortness a breath.  No chest pain.  No real sore throat.  His wife was concerned that he might have pneumonia.    He also tells me that he suffered a fall a couple of months ago with a dislocation of the shoulder and a small fracture.  Followed by Dr. Beasley.  He has been out of the sling now for about 2 weeks.    Cough        Review of Systems   Respiratory:  Positive for cough.        Outpatient Medications Marked as Taking for the 12/18/24 encounter (Office Visit) with Garcia Vail MD   Medication Sig Dispense Refill    ascorbic acid, vitamin C, (VITAMIN C) 500 MG tablet Take 500 mg by mouth 2 (two) times daily.      aspirin (ECOTRIN) 81 MG EC tablet Take 81 mg by mouth once daily.      enalapril (VASOTEC) 5 MG tablet Take 1 tablet (5 mg total) by mouth once daily. 90 tablet 3    finasteride (PROSCAR) 5 mg tablet Take 1 tablet (5 mg total) by mouth once daily. 90 tablet 3    hydroCHLOROthiazide (HYDRODIURIL) 25 MG tablet Take 1 tablet (25 mg total) by mouth once daily. 90 tablet 3    potassium chloride SA (K-DUR,KLOR-CON M) 10 MEQ tablet TAKE ONE TABLET BY MOUTH ONCE DAILY 90 tablet 0    terazosin (HYTRIN) 5 MG capsule Take 1 capsule (5 mg total) by mouth once daily. 90 capsule 3    ZINC PICOLINATE ORAL Take 25 mg by mouth once daily.         Objective:     /62 (BP Location: Right arm, Patient Position: Sitting)   Pulse (!) 54   Resp 18   Ht 5' 11" (1.803 m)   Wt 77 kg (169 lb 12.8 oz)   SpO2 98%   BMI 23.68 kg/m²     Physical Exam  Vitals reviewed.   Constitutional:      "  Appearance: Normal appearance.   HENT:      Head: Normocephalic and atraumatic.      Comments: Throat is clear.  Uvula absent.     Mouth/Throat:      Pharynx: Oropharynx is clear.   Eyes:      Pupils: Pupils are equal, round, and reactive to light.   Cardiovascular:      Rate and Rhythm: Normal rate and regular rhythm.      Pulses: Normal pulses.      Heart sounds: Normal heart sounds.   Pulmonary:      Breath sounds: Normal breath sounds.      Comments: Mild rhonchi worse at the left base.  Abdominal:      General: Abdomen is flat.      Palpations: Abdomen is soft.   Musculoskeletal:      Cervical back: Neck supple.   Skin:     General: Skin is warm and dry.   Neurological:      Mental Status: He is alert.         Assessment:   1. Cough.  Likely viral syndrome with secondary bacterial infection.  Suspect bronchitis.  Relatively mild     2. History of fall with dislocated shoulder.  Recovering.      Plan:   Z-Maribell.  Rest next few days.  Follow-up with me as scheduled  Problem List Items Addressed This Visit    None  Visit Diagnoses       Cough, unspecified type    -  Primary    Bronchitis                 No orders of the defined types were placed in this encounter.       Medication List with Changes/Refills   New Medications    AZITHROMYCIN (Z-MARIBELL) 250 MG TABLET    Take 2 tablets by mouth on day 1; Take 1 tablet by mouth on days 2-5       Start Date: 12/18/2024End Date: 12/23/2024   Current Medications    ASCORBIC ACID, VITAMIN C, (VITAMIN C) 500 MG TABLET    Take 500 mg by mouth 2 (two) times daily.       Start Date: --        End Date: --    ASPIRIN (ECOTRIN) 81 MG EC TABLET    Take 81 mg by mouth once daily.       Start Date: --        End Date: --    ENALAPRIL (VASOTEC) 5 MG TABLET    Take 1 tablet (5 mg total) by mouth once daily.       Start Date: 10/16/2024End Date: --    FINASTERIDE (PROSCAR) 5 MG TABLET    Take 1 tablet (5 mg total) by mouth once daily.       Start Date: 10/16/2024End Date: --     HYDROCHLOROTHIAZIDE (HYDRODIURIL) 25 MG TABLET    Take 1 tablet (25 mg total) by mouth once daily.       Start Date: 10/16/2024End Date: --    POTASSIUM CHLORIDE SA (K-DUR,KLOR-CON M) 10 MEQ TABLET    TAKE ONE TABLET BY MOUTH ONCE DAILY       Start Date: 10/15/2024End Date: --    TERAZOSIN (HYTRIN) 5 MG CAPSULE    Take 1 capsule (5 mg total) by mouth once daily.       Start Date: 10/16/2024End Date: --    ZINC PICOLINATE ORAL    Take 25 mg by mouth once daily.       Start Date: --        End Date: --            No follow-ups on file. In addition to their scheduled follow up, the patient has also been instructed to follow up on as needed basis.       Garcia Vail

## 2025-01-11 DIAGNOSIS — I10 HYPERTENSION, UNSPECIFIED TYPE: ICD-10-CM

## 2025-01-13 RX ORDER — POTASSIUM CHLORIDE 750 MG/1
10 TABLET, EXTENDED RELEASE ORAL
Qty: 90 TABLET | Refills: 0 | Status: SHIPPED | OUTPATIENT
Start: 2025-01-13

## 2025-01-24 ENCOUNTER — DOCUMENTATION ONLY (OUTPATIENT)
Dept: INTERNAL MEDICINE | Facility: CLINIC | Age: 80
End: 2025-01-24
Payer: MEDICARE

## 2025-03-12 ENCOUNTER — DOCUMENTATION ONLY (OUTPATIENT)
Dept: INTERNAL MEDICINE | Facility: CLINIC | Age: 80
End: 2025-03-12
Payer: MEDICARE

## 2025-03-20 ENCOUNTER — TELEPHONE (OUTPATIENT)
Dept: INTERNAL MEDICINE | Facility: CLINIC | Age: 80
End: 2025-03-20
Payer: MEDICARE

## 2025-03-20 NOTE — TELEPHONE ENCOUNTER
----- Message from Nurse Tamy sent at 3/20/2025 12:48 PM CDT -----  Regarding: maki Perales 03/27 @2:40  Fasting labs needed.

## 2025-03-21 LAB
ALBUMIN SERPL-MCNC: 4.3 G/DL (ref 3.8–4.8)
ALP SERPL-CCNC: 124 IU/L (ref 44–121)
ALT SERPL-CCNC: 11 IU/L (ref 0–44)
AST SERPL-CCNC: 20 IU/L (ref 0–40)
BASOPHILS # BLD AUTO: 0 X10E3/UL (ref 0–0.2)
BASOPHILS NFR BLD AUTO: 0 %
BILIRUB SERPL-MCNC: 0.4 MG/DL (ref 0–1.2)
BUN SERPL-MCNC: 14 MG/DL (ref 8–27)
BUN/CREAT SERPL: 14 (ref 10–24)
CALCIUM SERPL-MCNC: 9 MG/DL (ref 8.6–10.2)
CHLORIDE SERPL-SCNC: 101 MMOL/L (ref 96–106)
CHOLEST SERPL-MCNC: 172 MG/DL (ref 100–199)
CO2 SERPL-SCNC: 28 MMOL/L (ref 20–29)
CREAT SERPL-MCNC: 0.97 MG/DL (ref 0.76–1.27)
EOSINOPHIL # BLD AUTO: 0.2 X10E3/UL (ref 0–0.4)
EOSINOPHIL NFR BLD AUTO: 3 %
ERYTHROCYTE [DISTWIDTH] IN BLOOD BY AUTOMATED COUNT: 12.9 % (ref 11.6–15.4)
EST. GFR  (NO RACE VARIABLE): 80 ML/MIN/1.73
GLOBULIN SER CALC-MCNC: 2.8 G/DL (ref 1.5–4.5)
GLUCOSE SERPL-MCNC: 96 MG/DL (ref 70–99)
HCT VFR BLD AUTO: 42.2 % (ref 37.5–51)
HDLC SERPL-MCNC: 68 MG/DL
HGB BLD-MCNC: 14 G/DL (ref 13–17.7)
IMM GRANULOCYTES NFR BLD AUTO: 0 %
LDLC SERPL CALC-MCNC: 83 MG/DL (ref 0–99)
LYMPHOCYTES # BLD AUTO: 2.6 X10E3/UL (ref 0.7–3.1)
LYMPHOCYTES NFR BLD AUTO: 38 %
MCH RBC QN AUTO: 30.6 PG (ref 26.6–33)
MCHC RBC AUTO-ENTMCNC: 33.2 G/DL (ref 31.5–35.7)
MCV RBC AUTO: 92 FL (ref 79–97)
MONOCYTES # BLD AUTO: 0.7 X10E3/UL (ref 0.1–0.9)
MONOCYTES NFR BLD AUTO: 9 %
NEUTROPHILS # BLD AUTO: 3.4 X10E3/UL (ref 1.4–7)
NEUTROPHILS NFR BLD AUTO: 50 %
PLATELET # BLD AUTO: 195 X10E3/UL (ref 150–450)
POTASSIUM SERPL-SCNC: 4 MMOL/L (ref 3.5–5.2)
PROT SERPL-MCNC: 7.1 G/DL (ref 6–8.5)
PSA SERPL-MCNC: 6.1 NG/ML (ref 0–4)
RBC # BLD AUTO: 4.58 X10E6/UL (ref 4.14–5.8)
SODIUM SERPL-SCNC: 142 MMOL/L (ref 134–144)
SPECIMEN STATUS REPORT: NORMAL
TRIGL SERPL-MCNC: 119 MG/DL (ref 0–149)
TSH SERPL DL<=0.005 MIU/L-ACNC: 3.3 UIU/ML (ref 0.45–4.5)
VLDLC SERPL CALC-MCNC: 21 MG/DL (ref 5–40)
WBC # BLD AUTO: 6.9 X10E3/UL (ref 3.4–10.8)

## 2025-03-27 ENCOUNTER — OFFICE VISIT (OUTPATIENT)
Dept: INTERNAL MEDICINE | Facility: CLINIC | Age: 80
End: 2025-03-27
Payer: MEDICARE

## 2025-03-27 VITALS
RESPIRATION RATE: 18 BRPM | OXYGEN SATURATION: 98 % | HEART RATE: 53 BPM | WEIGHT: 177.63 LBS | BODY MASS INDEX: 24.87 KG/M2 | DIASTOLIC BLOOD PRESSURE: 64 MMHG | SYSTOLIC BLOOD PRESSURE: 118 MMHG | HEIGHT: 71 IN

## 2025-03-27 DIAGNOSIS — N40.0 BENIGN PROSTATIC HYPERPLASIA, UNSPECIFIED WHETHER LOWER URINARY TRACT SYMPTOMS PRESENT: ICD-10-CM

## 2025-03-27 DIAGNOSIS — I10 HYPERTENSION, UNSPECIFIED TYPE: Primary | ICD-10-CM

## 2025-03-27 DIAGNOSIS — I65.23 BILATERAL CAROTID ARTERY STENOSIS: ICD-10-CM

## 2025-03-27 PROCEDURE — 99213 OFFICE O/P EST LOW 20 MIN: CPT | Mod: ,,, | Performed by: INTERNAL MEDICINE

## 2025-03-27 NOTE — PROGRESS NOTES
"Patient ID: [id number]      Subjective:     Chief Complaint: Follow-up      German Godwin is a 79 y.o. male.  The patient is a 79-year-old man in for follow-up of numerous problems.  Overall he is feeling well.  He tells me that he has had an elevated PSA followed by Dr. Briceno.  He underwent a biopsy about 3 weeks ago.  I reviewed the results and they were all negative.  12/12.  Otherwise he has been doing well recently.    Follow-up        Review of Systems    Outpatient Medications Marked as Taking for the 3/27/25 encounter (Office Visit) with Garcia Vail MD   Medication Sig Dispense Refill    ascorbic acid, vitamin C, (VITAMIN C) 500 MG tablet Take 500 mg by mouth 2 (two) times daily.      aspirin (ECOTRIN) 81 MG EC tablet Take 81 mg by mouth once daily.      enalapril (VASOTEC) 5 MG tablet Take 1 tablet (5 mg total) by mouth once daily. 90 tablet 3    finasteride (PROSCAR) 5 mg tablet Take 1 tablet (5 mg total) by mouth once daily. 90 tablet 3    hydroCHLOROthiazide (HYDRODIURIL) 25 MG tablet Take 1 tablet (25 mg total) by mouth once daily. 90 tablet 3    potassium chloride SA (K-DUR,KLOR-CON M) 10 MEQ tablet TAKE ONE TABLET BY MOUTH ONCE DAILY 90 tablet 0    terazosin (HYTRIN) 5 MG capsule Take 1 capsule (5 mg total) by mouth once daily. 90 capsule 3    ZINC PICOLINATE ORAL Take 25 mg by mouth once daily.         Objective:     /64 (BP Location: Right arm, Patient Position: Sitting)   Pulse (!) 53   Resp 18   Ht 5' 11" (1.803 m)   Wt 80.6 kg (177 lb 9.6 oz)   SpO2 98%   BMI 24.77 kg/m²     Physical Exam  Vitals reviewed.   Constitutional:       Appearance: Normal appearance.   HENT:      Head: Normocephalic and atraumatic.      Mouth/Throat:      Pharynx: Oropharynx is clear.   Eyes:      Pupils: Pupils are equal, round, and reactive to light.   Cardiovascular:      Rate and Rhythm: Normal rate and regular rhythm.      Pulses: Normal pulses.      Heart sounds: Normal heart sounds. "   Pulmonary:      Breath sounds: Normal breath sounds.   Abdominal:      General: Abdomen is flat.      Palpations: Abdomen is soft.   Musculoskeletal:      Cervical back: Neck supple.   Skin:     General: Skin is warm and dry.   Neurological:      Mental Status: He is alert.     Lab work reviewed.  PSA is elevated but he is only 3 weeks status post biopsy.    Assessment:     1. Hypertension.  Good control     2. Mild carotid stenosis bilaterally.  Last ultrasound done 6 months ago.  Repeat due in 18 months     3. Prostatism.  Status post biopsy 3 weeks ago.  This explains the elevated PSA this time.        Plan:   Continue current meds TLC etc..  Follow-up with me for wellness check in 6 months with CBC CMP lipid PSA prior  Problem List Items Addressed This Visit          Renal/    Benign prostatic hyperplasia    Relevant Orders    CBC Auto Differential    Comprehensive Metabolic Panel    Lipid Panel     Other Visit Diagnoses         Hypertension, unspecified type    -  Primary    Relevant Orders    CBC Auto Differential    Comprehensive Metabolic Panel    Lipid Panel      Bilateral carotid artery stenosis        Relevant Orders    CBC Auto Differential    Comprehensive Metabolic Panel    Lipid Panel             Orders Placed This Encounter   Procedures    CBC Auto Differential     Standing Status:   Future     Expected Date:   9/27/2025     Expiration Date:   3/27/2026    Comprehensive Metabolic Panel     Standing Status:   Future     Expected Date:   9/27/2025     Expiration Date:   3/27/2026    Lipid Panel     Standing Status:   Future     Expected Date:   9/27/2025     Expiration Date:   3/27/2026        Medication List with Changes/Refills   Current Medications    ASCORBIC ACID, VITAMIN C, (VITAMIN C) 500 MG TABLET    Take 500 mg by mouth 2 (two) times daily.       Start Date: --        End Date: --    ASPIRIN (ECOTRIN) 81 MG EC TABLET    Take 81 mg by mouth once daily.       Start Date: --        End Date:  --    ENALAPRIL (VASOTEC) 5 MG TABLET    Take 1 tablet (5 mg total) by mouth once daily.       Start Date: 10/16/2024End Date: --    FINASTERIDE (PROSCAR) 5 MG TABLET    Take 1 tablet (5 mg total) by mouth once daily.       Start Date: 10/16/2024End Date: --    HYDROCHLOROTHIAZIDE (HYDRODIURIL) 25 MG TABLET    Take 1 tablet (25 mg total) by mouth once daily.       Start Date: 10/16/2024End Date: --    POTASSIUM CHLORIDE SA (K-DUR,KLOR-CON M) 10 MEQ TABLET    TAKE ONE TABLET BY MOUTH ONCE DAILY       Start Date: 1/13/2025 End Date: --    TERAZOSIN (HYTRIN) 5 MG CAPSULE    Take 1 capsule (5 mg total) by mouth once daily.       Start Date: 10/16/2024End Date: --    ZINC PICOLINATE ORAL    Take 25 mg by mouth once daily.       Start Date: --        End Date: --            No follow-ups on file. In addition to their scheduled follow up, the patient has also been instructed to follow up on as needed basis.       Garcia Vail

## 2025-04-11 DIAGNOSIS — I10 HYPERTENSION, UNSPECIFIED TYPE: ICD-10-CM

## 2025-04-11 RX ORDER — POTASSIUM CHLORIDE 750 MG/1
10 TABLET, EXTENDED RELEASE ORAL
Qty: 90 TABLET | Refills: 0 | Status: SHIPPED | OUTPATIENT
Start: 2025-04-11

## 2025-07-11 ENCOUNTER — DOCUMENTATION ONLY (OUTPATIENT)
Dept: INTERNAL MEDICINE | Facility: CLINIC | Age: 80
End: 2025-07-11
Payer: MEDICARE

## 2025-07-29 DIAGNOSIS — I10 HYPERTENSION, UNSPECIFIED TYPE: ICD-10-CM

## 2025-07-29 RX ORDER — POTASSIUM CHLORIDE 750 MG/1
10 TABLET, EXTENDED RELEASE ORAL
Qty: 90 TABLET | Refills: 0 | Status: SHIPPED | OUTPATIENT
Start: 2025-07-29